# Patient Record
Sex: MALE | Race: WHITE | Employment: FULL TIME | URBAN - METROPOLITAN AREA
[De-identification: names, ages, dates, MRNs, and addresses within clinical notes are randomized per-mention and may not be internally consistent; named-entity substitution may affect disease eponyms.]

---

## 2024-07-21 NOTE — PROGRESS NOTES
Beny Mcgraw is a 29 y.o. male who presents today with c/o   Chief Complaint   Patient presents with    New Patient    Hand Pain           Assessment/ Plan:       ASSESSMENT AND PLAN    1. Healthcare maintenance  Check labs--he does report elevated cholesterol in the past  - CBC with Auto Differential; Future  - Comprehensive Metabolic Panel; Future  - Lipid Panel; Future      2. Left hand pain  Continue ibuprofen PRN  Use a cold compress every night on the area.  Negative phalen and tinel test in the office today  Suspect his pain is from overuse/arthritis. Exam is not concerning in the office today.  Obtain xray if pain does not improve.   Will consider referral to ortho if pain does not continue to resolve on its won.  - XR HAND LEFT (MIN 3 VIEWS); Future      Return in about 3 months (around 10/22/2024) for hand pain.       Subjective:  Beny Mcgraw is a 29 y.o. male here to establish care.  Living with his parents. Dad is a patient with the office.  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   Wants to get a physical with basic labs. Thinks he had high cholesterol in the past. Denies significant PMH and does not take any daily medications.    He does report some pain to his left thumb and index finger for a few weeks. Pain will come and go. No recent injury. Denies swelling/warmth to the area. Takes ibuprofen PRN which does help, but \"it wears off quick.\" Thought it could be carpal tunnel.        There is no problem list on file for this patient.      History reviewed. No pertinent past medical history.    No current outpatient medications on file.    No Known Allergies    History reviewed. No pertinent surgical history.    Social History     Tobacco Use   Smoking Status Never    Passive exposure: Never   Smokeless Tobacco Never       Social History     Socioeconomic History    Marital status: Single     Spouse name: None    Number of children: None    Years of education: None    Highest education

## 2024-07-22 ENCOUNTER — OFFICE VISIT (OUTPATIENT)
Age: 29
End: 2024-07-22
Payer: COMMERCIAL

## 2024-07-22 VITALS
OXYGEN SATURATION: 98 % | HEIGHT: 63 IN | HEART RATE: 63 BPM | SYSTOLIC BLOOD PRESSURE: 118 MMHG | RESPIRATION RATE: 18 BRPM | DIASTOLIC BLOOD PRESSURE: 83 MMHG | TEMPERATURE: 97 F | BODY MASS INDEX: 33.31 KG/M2 | WEIGHT: 188 LBS

## 2024-07-22 DIAGNOSIS — M79.642 LEFT HAND PAIN: ICD-10-CM

## 2024-07-22 DIAGNOSIS — Z00.00 HEALTHCARE MAINTENANCE: Primary | ICD-10-CM

## 2024-07-22 PROCEDURE — 36415 COLL VENOUS BLD VENIPUNCTURE: CPT | Performed by: NURSE PRACTITIONER

## 2024-07-22 PROCEDURE — 99385 PREV VISIT NEW AGE 18-39: CPT | Performed by: NURSE PRACTITIONER

## 2024-07-22 ASSESSMENT — ENCOUNTER SYMPTOMS
EYES NEGATIVE: 1
ALLERGIC/IMMUNOLOGIC NEGATIVE: 1
RESPIRATORY NEGATIVE: 1
GASTROINTESTINAL NEGATIVE: 1

## 2024-07-22 NOTE — PROGRESS NOTES
\"Have you been to the ER, urgent care clinic since your last visit?  Hospitalized since your last visit?\"    NO    “Have you seen or consulted any other health care providers outside of [facility] since your last visit?”    NO            Click Here for Release of Records Request

## 2024-07-23 DIAGNOSIS — R73.09 ELEVATED GLUCOSE: Primary | ICD-10-CM

## 2024-07-23 LAB
ALBUMIN SERPL-MCNC: 4.7 G/DL (ref 3.5–5)
ALBUMIN/GLOB SERPL: 1.8 (ref 1.1–2.2)
ALP SERPL-CCNC: 123 U/L (ref 45–117)
ALT SERPL-CCNC: 42 U/L (ref 12–78)
ANION GAP SERPL CALC-SCNC: 5 MMOL/L (ref 5–15)
AST SERPL-CCNC: 16 U/L (ref 15–37)
BASOPHILS # BLD: 0 K/UL (ref 0–0.1)
BASOPHILS NFR BLD: 0 % (ref 0–1)
BILIRUB SERPL-MCNC: 0.5 MG/DL (ref 0.2–1)
BUN SERPL-MCNC: 18 MG/DL (ref 6–20)
BUN/CREAT SERPL: 17 (ref 12–20)
CALCIUM SERPL-MCNC: 9.8 MG/DL (ref 8.5–10.1)
CHLORIDE SERPL-SCNC: 105 MMOL/L (ref 97–108)
CHOLEST SERPL-MCNC: 214 MG/DL
CO2 SERPL-SCNC: 29 MMOL/L (ref 21–32)
CREAT SERPL-MCNC: 1.06 MG/DL (ref 0.7–1.3)
DIFFERENTIAL METHOD BLD: NORMAL
EOSINOPHIL # BLD: 0.1 K/UL (ref 0–0.4)
EOSINOPHIL NFR BLD: 1 % (ref 0–7)
ERYTHROCYTE [DISTWIDTH] IN BLOOD BY AUTOMATED COUNT: 12.6 % (ref 11.5–14.5)
GLOBULIN SER CALC-MCNC: 2.6 G/DL (ref 2–4)
GLUCOSE SERPL-MCNC: 138 MG/DL (ref 65–100)
HCT VFR BLD AUTO: 47 % (ref 36.6–50.3)
HDLC SERPL-MCNC: 50 MG/DL
HDLC SERPL: 4.3 (ref 0–5)
HGB BLD-MCNC: 15.9 G/DL (ref 12.1–17)
IMM GRANULOCYTES # BLD AUTO: 0 K/UL (ref 0–0.04)
IMM GRANULOCYTES NFR BLD AUTO: 0 % (ref 0–0.5)
LDLC SERPL CALC-MCNC: 126.2 MG/DL (ref 0–100)
LYMPHOCYTES # BLD: 2.2 K/UL (ref 0.8–3.5)
LYMPHOCYTES NFR BLD: 32 % (ref 12–49)
MCH RBC QN AUTO: 29.6 PG (ref 26–34)
MCHC RBC AUTO-ENTMCNC: 33.8 G/DL (ref 30–36.5)
MCV RBC AUTO: 87.5 FL (ref 80–99)
MONOCYTES # BLD: 0.5 K/UL (ref 0–1)
MONOCYTES NFR BLD: 8 % (ref 5–13)
NEUTS SEG # BLD: 4 K/UL (ref 1.8–8)
NEUTS SEG NFR BLD: 59 % (ref 32–75)
NRBC # BLD: 0 K/UL (ref 0–0.01)
NRBC BLD-RTO: 0 PER 100 WBC
PLATELET # BLD AUTO: 252 K/UL (ref 150–400)
PMV BLD AUTO: 12.7 FL (ref 8.9–12.9)
POTASSIUM SERPL-SCNC: 4.7 MMOL/L (ref 3.5–5.1)
PROT SERPL-MCNC: 7.3 G/DL (ref 6.4–8.2)
RBC # BLD AUTO: 5.37 M/UL (ref 4.1–5.7)
SODIUM SERPL-SCNC: 139 MMOL/L (ref 136–145)
TRIGL SERPL-MCNC: 189 MG/DL
VLDLC SERPL CALC-MCNC: 37.8 MG/DL
WBC # BLD AUTO: 6.8 K/UL (ref 4.1–11.1)

## 2024-07-24 DIAGNOSIS — R73.09 ELEVATED GLUCOSE: ICD-10-CM

## 2024-07-24 LAB
EST. AVERAGE GLUCOSE BLD GHB EST-MCNC: 103 MG/DL
HBA1C MFR BLD: 5.2 % (ref 4–5.6)

## 2024-07-31 ENCOUNTER — HOSPITAL ENCOUNTER (OUTPATIENT)
Facility: HOSPITAL | Age: 29
Discharge: HOME | End: 2024-08-03
Payer: COMMERCIAL

## 2024-07-31 DIAGNOSIS — M79.642 LEFT HAND PAIN: ICD-10-CM

## 2024-07-31 PROCEDURE — 73130 X-RAY EXAM OF HAND: CPT
